# Patient Record
Sex: MALE | Race: WHITE | NOT HISPANIC OR LATINO | URBAN - METROPOLITAN AREA
[De-identification: names, ages, dates, MRNs, and addresses within clinical notes are randomized per-mention and may not be internally consistent; named-entity substitution may affect disease eponyms.]

---

## 2019-05-01 ENCOUNTER — OFFICE VISIT CONVERTED (OUTPATIENT)
Dept: UROLOGY | Facility: CLINIC | Age: 69
End: 2019-05-01
Attending: UROLOGY

## 2020-09-29 ENCOUNTER — OFFICE VISIT CONVERTED (OUTPATIENT)
Dept: UROLOGY | Facility: CLINIC | Age: 70
End: 2020-09-29
Attending: NURSE PRACTITIONER

## 2020-10-27 ENCOUNTER — TELEPHONE CONVERTED (OUTPATIENT)
Dept: UROLOGY | Facility: CLINIC | Age: 70
End: 2020-10-27
Attending: NURSE PRACTITIONER

## 2020-11-13 ENCOUNTER — OFFICE VISIT CONVERTED (OUTPATIENT)
Dept: UROLOGY | Facility: CLINIC | Age: 70
End: 2020-11-13
Attending: UROLOGY

## 2020-11-13 ENCOUNTER — CONVERSION ENCOUNTER (OUTPATIENT)
Dept: SURGERY | Facility: CLINIC | Age: 70
End: 2020-11-13

## 2020-11-14 ENCOUNTER — HOSPITAL ENCOUNTER (OUTPATIENT)
Dept: GENERAL RADIOLOGY | Facility: HOSPITAL | Age: 70
Discharge: HOME | End: 2020-11-14
Attending: UROLOGY

## 2020-11-14 ENCOUNTER — HOSPITAL ENCOUNTER (OUTPATIENT)
Dept: PREADMISSION TESTING | Facility: HOSPITAL | Age: 70
Discharge: HOME | End: 2020-11-14
Attending: UROLOGY

## 2020-11-14 LAB
ANION GAP SERPL CALC-SCNC: 13 MMOL/L (ref 8–19)
BUN SERPL-MCNC: 34 MG/DL (ref 5–25)
BUN/CREAT SERPL: 17 {RATIO} (ref 6–20)
CALCIUM SERPL-MCNC: 9.6 MG/DL (ref 8.7–10.4)
CHLORIDE SERPL-SCNC: 97 MMOL/L (ref 99–111)
CONV CO2: 27 MMOL/L (ref 22–32)
CREAT UR-MCNC: 2 MG/DL (ref 0.7–1.2)
GFR SERPLBLD BASED ON 1.73 SQ M-ARVRAT: 32 ML/MIN/{1.73_M2}
GLUCOSE SERPL-MCNC: 113 MG/DL (ref 70–99)
OSMOLALITY SERPL CALC.SUM OF ELEC: 282 MOSM/KG (ref 273–304)
POTASSIUM SERPL-SCNC: 4.6 MMOL/L (ref 3.5–5.3)
SODIUM SERPL-SCNC: 132 MMOL/L (ref 135–147)

## 2020-11-15 LAB — SARS-COV-2 RNA SPEC QL NAA+PROBE: NOT DETECTED

## 2020-11-18 ENCOUNTER — HOSPITAL ENCOUNTER (OUTPATIENT)
Dept: PERIOP | Facility: HOSPITAL | Age: 70
Setting detail: HOSPITAL OUTPATIENT SURGERY
Discharge: HOME | End: 2020-11-18
Attending: UROLOGY

## 2020-12-15 ENCOUNTER — OFFICE VISIT CONVERTED (OUTPATIENT)
Dept: UROLOGY | Facility: CLINIC | Age: 70
End: 2020-12-15
Attending: UROLOGY

## 2020-12-15 ENCOUNTER — HOSPITAL ENCOUNTER (OUTPATIENT)
Dept: OTHER | Facility: HOSPITAL | Age: 70
Discharge: HOME | End: 2020-12-15
Attending: UROLOGY

## 2020-12-15 ENCOUNTER — HOSPITAL ENCOUNTER (OUTPATIENT)
Dept: SURGERY | Facility: CLINIC | Age: 70
Discharge: HOME | End: 2020-12-15
Attending: UROLOGY

## 2020-12-15 LAB
ANION GAP SERPL CALC-SCNC: 12 MMOL/L (ref 8–19)
BUN SERPL-MCNC: 23 MG/DL (ref 5–25)
BUN/CREAT SERPL: 19 {RATIO} (ref 6–20)
CALCIUM SERPL-MCNC: 9.3 MG/DL (ref 8.7–10.4)
CHLORIDE SERPL-SCNC: 99 MMOL/L (ref 99–111)
CONV CO2: 30 MMOL/L (ref 22–32)
CREAT UR-MCNC: 1.2 MG/DL (ref 0.7–1.2)
GFR SERPLBLD BASED ON 1.73 SQ M-ARVRAT: 60 ML/MIN/{1.73_M2}
GLUCOSE SERPL-MCNC: 93 MG/DL (ref 70–99)
OSMOLALITY SERPL CALC.SUM OF ELEC: 285 MOSM/KG (ref 273–304)
POTASSIUM SERPL-SCNC: 4.8 MMOL/L (ref 3.5–5.3)
SODIUM SERPL-SCNC: 136 MMOL/L (ref 135–147)

## 2020-12-18 LAB
BACTERIA UR CULT: ABNORMAL
CIPROFLOXACIN SUSC ISLT: >=8
DAPTOMYCIN SUSC ISLT: 0.5
DOXYCYCLINE SUSC ISLT: 1
ERYTHROMYCIN SUSC ISLT: <=0.25
GENTAMICIN SUSC ISLT: <=0.5
LEVOFLOXACIN SUSC ISLT: >=8
NITROFURANTOIN SUSC ISLT: <=16
OXACILLIN SUSC ISLT: >=4
RIFAMPIN SUSC ISLT: <=0.5
TETRACYCLINE SUSC ISLT: 2
TIGECYCLINE SUSC ISLT: 0.25
TMP SMX SUSC ISLT: <=10
VANCOMYCIN SUSC ISLT: 2

## 2021-05-10 NOTE — H&P
"   History and Physical      Patient Name: Srinath Slaughter   Patient ID: [id number]   Sex: Male   YOB: 1950    Primary Care Provider: Val Horn NP   Referring Provider: Val Horn NP    Visit Date: November 13, 2020    Provider: Damion Rodrigues MD   Location: [facility]   Location Address: [address] [place] [address]  [id number]   Location Phone: [phone]          Chief Complaint  · Outpatient History & Physical / Surgical Orders      History Of Present Illness  [facility]  Outpatient History and Physical Surgical Orders  Preadmission Location: Phone Preadmission Time: 03:00 PM   Which Facility: [facility] Surgery Date: 11/18/2020 Preadmission Testing Date: 11/16/2020   Patient's Name: Srinath Slaughter YOB: 1950   Chief complaint/history present illness: urinary retention   Current Medication List: Colace 100 mg oral capsule, levofloxacin 250 mg oral tablet, lisinopril 40 mg oral tablet, Miralax 17 gram/dose oral powder, potassium chloride 20 mEq oral tablet extended release, tamsulosin 0.4 mg oral capsule, and torsemide 20 mg oral tablet   Allergies: Latex Exam Gloves and No known history of drug allergy   Significant past medical: Bladder Disorder, Elevated PSA, Hypertension, Prostate Disorder, and Prostate mass   Past Surgical History: Hernia Repair, Hip replacement, right, and Hip replacement, total, left   Examination of heart and lungs: Regular rate, rhythm, no murmur, gallop, rub, Breath sounds normal, no distress, and Abdomen soft, non-tender, BSx4 are positive         Vitals  Date Time BP Position Site L\R Cuff Size HR RR TEMP (F) WT  HT  BMI kg/m2 BSA m2 O2 Sat FR L/min FiO2 HC       11/13/2020 02:07 PM       22  300lbs 0oz 5'  9\" 44.3 2.57                 Assessment  · Pre-Surgical Orders     V72.84      Plan  · Orders  o General Urology Surgery Order (UROSU) - V72.84 - 11/18/2020  o [facility] Covid-19 " Screening (26653) - V72.84 - 11/14/2020   scheduled at 1330  · Medications  o Medications have been Reconciled  o Transition of Care or Provider Policy  · Instructions  o *****Surgical Orders******  o Pre-Operative Orders: Sign permit for Transurethral Resection of Bladder Tumor and possible instillation of gemcitabine into bladder  o Outpatient   o Levaquin 250 mg IV OCTOR.  o RISK AND BENEFITS:  o Possible risks/complications, benefits and alternatives to surgical or invasive procedure have been explained to patient and/or legal guardian.            Electronically Signed by: Damion Rodrigues MD -Author on November 13, 2020 04:21:57 PM

## 2021-05-10 NOTE — PROCEDURES
Procedure Note      Patient Name: Srinath Slaughter   Patient ID: [id number]   Sex: Male   YOB: 1950    Primary Care Provider: Val Horn NP   Referring Provider: Val Horn NP    Visit Date: November 13, 2020    Provider: Damion Rodrigues MD   Location: [facility]   Location Address: [address] [place] [address]  [id number]   Location Phone: [phone]          Cystoscopy Procedure:  The patients urine was viewe d under a microscope during his clinical visit: no RBC present, no WBC present, no Bacteria present.      Uncircumcised phallus    PROCEDURE: Flexible cystoscope was passed per urethra into the bladder without difficulty after proper consent.     5 cm prostate    Bladder was large with some moderate trabeculations throughout.    On the right lateral wall towards the dome there was a large area 4 cm x 5 cm that was abnormal looking with some rugated appearance.  Erythematous and raised.  Not papillary in nature    The bladder was inspected in a systematic meridian fashion.  Both ureteral orifices were identified and were normal in appearance. The flexible cystoscope was removed. The patient tolerated the procedure well.           Assessment  · BPH with urinary obstruction       Benign prostatic hyperplasia with lower urinary tract symptoms     600.01/N40.1  Other obstructive and reflux uropathy     600.01/N13.8      Plan  · Orders  o Cystoscopy (75498) - 600.01/N40.1, 600.01/N13.8 - 11/13/2020  · Medications  o Medications have been Reconciled  o Transition of Care or Provider Policy                    Electronically Signed by: Damion Rodrigues MD -Author on November 13, 2020 04:00:41 PM

## 2021-05-10 NOTE — H&P
History and Physical      Patient Name: Srinath Slaughter   Patient ID: [id number]   Sex: Male   YOB: 1950    Primary Care Provider: Srinath Eubanks MD   Referring Provider: Val Horn NP    Visit Date: September 29, 2020    Provider: VICTOR MANUEL Hodge   Location: [facility]   Location Address: [address] [place] [address]  [id number]   Location Phone: [phone]          Chief Complaint  · pt here for urologic concerns      History Of Present Illness  The patient is a 70 year old /White male, who is sent by Val Horn NP, for evaluation of urinary problems.   Symptoms  The patient's complaints are described as frequency, dysuria, urgency of urination, urge incontinence, and weak stream. This patient denies gross hematuria.   He states that nothing has made symptoms better in the past, and nothing makes it worse. The patient's past medical history notable for PSA elevation.      States that he has had this issue that is worsened over the last 2 weeks.    He saw his primary care provider on the 20th of this month for a follow-up.  At that point in time he had mentioned to his primary care provider that he had some urinary frequency and the primary care provider noted that he had had a previously elevated PSA that he had seen urology for in 2019.    The patient's previous PSA level from 9/23/2019 was 5.4 although his PSA obtained after that visit has now risen to 10.9 on 9/26/2020    His renal function has also declined as well and unsure if this is from a bladder outlet obstruction or from another process.     The patient was started on doxycycline and tamsulosin after that visit with his primary care provider as his symptoms sounded consistent with prostatitis as the patient stated he had burning with urination increased nighttime voiding and urinary urgency with urinary frequency.    The patient was referred back to urology as there was a concern for acute  "prostatitis.    PVR today is 271 and the patient has had to leave the exam room multiple times to void during his visit.         Past Medical History  Disease Name Date Onset Notes   Bladder Disorder --  --    Elevated PSA --  --    Hypertension --  --    Prostate Disorder --  --          Past Surgical History  Procedure Name Date Notes   Hernia --  --    Hernia Repair --  --    Hip replacement, right --  --    Hip replacement, total, left --  --          Medication List  Name Date Started Instructions   Colace 100 mg oral capsule  take 1 capsule (100 mg) by oral route once daily   doxycycline monohydrate 100 mg oral capsule 2020 take 1 capsule (100 mg) by oral route 2 times per day for 14 days   lisinopril 40 mg oral tablet  take 1 tablet (40 mg) by oral route once daily   Miralax 17 gram/dose oral powder  take 17 gram mixed with 8 oz. water, juice, soda, coffee or tea by oral route once daily   potassium chloride 20 mEq oral tablet extended release  take 1 tablet (20 meq) by oral route once daily with food   tamsulosin 0.4 mg oral capsule 2020 take 2 capsules (0.8 mg) by oral route once daily 1/2 hour following the same meal each day for 30 days   torsemide 20 mg oral tablet  take 2 tablets (40 mg) by oral route once daily         Allergy List  Allergen Name Date Reaction Notes   Latex Exam Gloves --  --  --    No known history of drug allergy --  --  --          Family Medical History  Disease Name Relative/Age Notes   Stroke Mother/   --    Heart Failure Grandfather (maternal)/  Grandmother (maternal)/    MI age 90\"s         Social History  Finding Status Start/Stop Quantity Notes   Alcohol Never --/-- --  --    Smokeless tobacco Current some day --/-- --  --    Tobacco Never --/-- --  --          Review of Systems  · Constitutional  o Denies  o : fever, chills  · Eyes  o Denies  o : double vision, cataracts  · HENT  o Denies  o : hearing loss, headaches  · Cardiovascular  o Denies  o : chest " "pain at rest, chest pain with exercise, irregular heart beats, palpitations, leg cramps with exercise  · Respiratory  o Denies  o : shortness of breath, wheezing, sleep apnea  · Gastrointestinal  o Denies  o : heartburn or indigestion, nausea or vomiting, change in abdominal girth, diarrhea, constipation, blood in stools  · Genitourinary  o Admits  o : additional symptoms as noted in HPI  · Integument  o Denies  o : rash, new skin lesions  · Neurologic  o Denies  o : memory difficulties, headache, mini-strokes, seizures  · Endocrine  o Denies  o : hot flashes, thyroid disorders  · Psychiatric  o Denies  o : depression, schizophrenia, bipolar disorder  · Heme-Lymph  o Denies  o : easy bleeding, easy bruising, sickle cell disease or trait, lymph node enlargement or tenderness  · Allergic-Immunologic  o Denies  o : immune deficiency, HIV, Hepatitis C      Vitals  Date Time BP Position Site L\R Cuff Size HR RR TEMP (F) WT  HT  BMI kg/m2 BSA m2 O2 Sat FR L/min FiO2        09/29/2020 12:54 PM       14  300lbs 6oz 5'  10\" 43.1 2.59             Physical Examination  · Constitutional  o Appearance  o : Well nourished, well developed patient in no acute distress. Ambulating without difficulty.  · Head and Face  o Head  o :   § Inspection  § : atraumatic, normocephalic  o Face  o :   § Inspection  § : no facial lesions  · Eyes  o Sclerae  o : sclerae white  · Ears, Nose, Mouth and Throat  o Ears  o :   § External Ears  § : appearance within normal limits, no lesions present  o Nose  o :   § External Nose  § : appearance normal  · Neck  o Inspection/Palpation  o : normal appearance, trachea midline  · Respiratory  o Respiratory Effort  o : breathing unlabored  o Inspection of Chest  o : normal appearance, no retractions  o Auscultation of Lungs  o : normal breath sounds throughout  · Genitourinary  o Bladder  o : non-palpable  o Penis  o : normal general appearance, circumcised, no lesions present, no discharge, no masses " present  o Urethral Meatus  o : no inflammation present, no stenosis  o Digital Rectal Examination  o :   § Tone and Masses  § : normal sphincter tone, no rectal masses present  § Prostate  § : nontender to palpation, enlarged, right lobe larger than the left l, no nodules present, consistency normal  § Seminal Vesicles  § : normal size and symmetry without masses or tenderness  · Musculoskeletal  o Pelvis  o : no pelvic bony or muscular tenderness  o Ribs  o : no deformities or tenderness to palpation present, costochondral junctions nontender to palpation  · Skin and Subcutaneous Tissue  o General Inspection  o : no rashes or lesions present, no lesions present, no areas of discoloration  · Neurologic  o Mental Status Examination  o :   § Orientation  § : grossly oriented to person, place and time  § Speech/Language  § : communication ability within normal limits  o Gait and Station  o : normal gait, able to stand without difficulty  · Psychiatric  o Judgement and Insight  o : judgment and insight intact, judgement for everyday activities and social situations within normal limits, insight intact  o Mood and Affect  o : mood normal, affect appropriate          Results  · In-Office Procedures  o Surgical procedure  § IOP - Bladder Scan/Residual Urine (13310)   § Specimen vol Ur: 271   o Lab procedure  § Automated dipstick urinalysis with microscopy (68090)   § Color Ur: Yellow   § Clarity Ur: Clear   § Glucose Ur Ql Strip: Negative   § Bilirub Ur Ql Strip: Negative   § Ketones Ur Ql Strip: Negative   § Sp Gr Ur Qn: 1.015   § Hgb Ur Ql Strip: Negative   § pH Ur-LsCnc: 5.5   § Prot Ur Ql Strip: Negative   § Urobilinogen Ur Strip-mCnc: 0.2 E.U./dL   § Nitrite Ur Ql Strip: Negative   § WBC Est Ur Ql Strip: Negative   § RBC UrnS Qn HPF: 0   § WBC UrnS Qn HPF: 0   § Bacteria UrnS Qn HPF: 0   § Crystals UrnS Qn HPF: 0   § Epithelial Cells (non renal): 0 /HPF  § Epithelial Cells (renal): 0       Assessment  · Elevated  prostate specific antigen (PSA)     790.93/R97.20  · Prostate mass     602.8/N42.89  · Prostatitis     601.9/N41.9    Problems Reconciled  Plan  · Orders  o MRI prostate wo then w contrast (52470) - 602.8/N42.89, 601.9/N41.9, 790.93/R97.20 - 09/29/2020  · Medications  o doxycycline monohydrate 100 mg oral capsule   SIG: take 1 capsule (100 mg) by oral route 2 times per day for 14 days   DISP: (28) Capsule with 0 refills  Prescribed on 09/30/2020     o tamsulosin 0.4 mg oral capsule   SIG: take 2 capsules (0.8 mg) by oral route once daily 1/2 hour following the same meal each day for 30 days   DISP: (60) Capsule with 11 refills  Adjusted on 09/30/2020     o Medications have been Reconciled  o Transition of Care or Provider Policy  · Instructions  o DISCUSSION: elevated PSA to 10.9; prior PSA 5.4DRE findings discussed; no family h/o prostate cancer in family. Recommend proceeding with further w/u for prostate cancer. Options include standard prostate biopsy or prostate MRI with MR fusion biopsy as indicated. Discussed this recommendation with patient at length. Patient aware via the autopsy study that his risk of having prostate cancer is approximate to his age; 60%. Patient also aware that the majority of prostate cancers detected are low or very low risk that are amenable to active surveillance protocols. Patient notes understanding that whether or not further workup for prostate cancer is pursued, a diagnosis of clinically significant prostate cancer would remains uncertain unless detected on biopsy. A negative biopsy, although reassuring, would not eliminate the possible need for further surveillance of PSA, possible future biopsy, and imaging as he have undetected prostate cancer. Also discussed the significance of PSA as a risk stratifier; decision to perform biopsy is not reliant upon 1 elevated lab value however his asymmetrical enlargement in his prostate dose confirm that further testing will need to be  performed . Patient and son in law participated in the discussion and asked appropriate questions. All questions answered to the best of my ability and his apparent satisfaction.   o Findings, possible etiologies and management options were discussed with patient in comprehensive detail  o PLAN:  o Rx. given for medication...  o Schedue further diagnostic testing  o Electronically Identified Patient Education Materials Provided Electronically            Electronically Signed by: VICTOR MANUEL Hodge -Author on September 30, 2020 09:20:41 PM

## 2021-05-13 NOTE — PROGRESS NOTES
Progress Note      Patient Name: Srinath Slaughter   Patient ID: [id number]   Sex: Male   YOB: 1950    Primary Care Provider: Val Horn NP   Referring Provider: Val Horn NP    Visit Date: November 13, 2020    Provider: Damion Rodrigues MD   Location: [facility]   Location Address: [address] [place] [address]  [id number]   Location Phone: [phone]          Chief Complaint  · pt here for urologic issues      History Of Present Illness     70-year-old  gentleman h/o elevated PSA her today with BPH with retention, large volume 1800 mL    Currently on Flomax 0.8 mg daily.  Patient had a frequency every 15 minutes.  Very bothersome.    no gross hematuria, dysuria or recurrent urinary tract infections.      No urologic family history,   Has never had any urologic surgery.    H/o CHF on Dr Hanley.  Patient does not smoke.  Patient does not use blood thinner. ZHEN    9/20 creatinine 2.5, GFR 24  3/19 creatinine 1.5, GFR 46    PVR    10/20  271      PSA    10/20 MRI prostate - 112 g, marked distention of the urinary bladder, small bladder diverticuli and trabeculation.  Before T2 localizing lesion.  PSA 0.09  9/19 10.9  9/19  5.4  3/19  7.2  11/17 prostate biopsy 45 g -negative  9/17 7.37  8/17 6.32       Past Medical History  Bladder Disorder; Elevated PSA; Hypertension; Prostate Disorder; Prostate mass         Past Surgical History  Hernia Repair; Hip replacement, right; Hip replacement, total, left         Medication List  Colace 100 mg oral capsule; lisinopril 40 mg oral tablet; Miralax 17 gram/dose oral powder; potassium chloride 20 mEq oral tablet extended release; tamsulosin 0.4 mg oral capsule; torsemide 20 mg oral tablet         Allergy List  Latex Exam Gloves; No known history of drug allergy         Family Medical History  Stroke; Heart Failure         Social History  Alcohol (Never); Smokeless tobacco (Current some day); Tobacco (Never)         Review  "of Systems  · Constitutional  o Denies  o : chills  · Gastrointestinal  o Denies  o : nausea      Vitals  Date Time BP Position Site L\R Cuff Size HR RR TEMP (F) WT  HT  BMI kg/m2 BSA m2 O2 Sat FR L/min FiO2        11/13/2020 02:07 PM       22  300lbs 0oz 5'  9\" 44.3 2.57             Physical Examination  · Constitutional  o Appearance  o : Well-appearing, well-developed, in no acute distress  · Respiratory  o Respiratory Effort  o : Unlabored breathing  · Cardiovascular  o Heart  o :   § Auscultation of Heart  § : Regular rate and rhythm, no murmurs  · Gastrointestinal  o Abdominal Examination  o : Nontender, nondistended, no rigidity or guarding, no hepatosplenomegaly  · Genitourinary  o Bladder  o : nonpalpable  o Penis  o : circumcised phallus with no masses or lesions  o Urethral Meatus  o : no inflammation present and no stenosis  o Scrotum and Scrotal Contents  o :   § Testes  § : Bilateral descended testicles no masses or lesions  o Digital Rectal Examination  o :   § Tone and Masses  § : Normal sphincter tone, no rectal masses present  § Prostate  § : prostate nontender with no nodules and normal consistency  · Neurologic  o Mental Status Examination  o :   § Orientation  § : Grossly oriented to person, place and time, judgment and insight intact, normal mood and affect          Results  · In-Office Procedures  o Surgical procedure  § IOP - Bladder Scan/Residual Urine (33797)   § Specimen vol Ur: 944   o Lab procedure  § Automated Dipstick Urinalysis (Surg Spec) WITHOUT Micro HMH (27295)   § Color Ur: Yellow   § Clarity Ur: Clear   § Glucose Ur Ql Strip: Negative   § Bilirub Ur Ql Strip: Negative   § Ketones Ur Ql Strip: Negative   § Sp Gr Ur Qn: 1.010   § Hgb Ur Ql Strip: Negative   § pH Ur-LsCnc: 5.5   § Prot Ur Ql Strip: Negative   § Urobilinogen Ur Strip-mCnc: 0.2 E.U./dL   § Nitrite Ur Ql Strip: Negative   § WBC Est Ur Ql Strip: Negative       Assessment  · Elevated PSA     790.93/R97.20  · Urinary " retention     788.20/R33.9  · BPH (benign prostatic hyperplasia)     600.00/N40.0  · Bladder tumor     239.4/D49.4      Plan  · Medications  o Medications have been Reconciled  o Transition of Care or Provider Policy  · Instructions  o Electronically Identified Patient Education Materials Provided Electronically     Elevated PSA    Patient with a large prostate.  Low PSA density and negative prostate MRI.  Discussed with the patient at this time I do not see any indication for biopsy.  We did discuss there is always some risk of finding a low-grade prostate cancer on TURP specimen but patient understands and accepts these risks.      BPH with urinary retention    Patient with large volume retention today, on cystoscopy was found to have small abnormal tissue on the right side of his bladder possible bladder tumor/cancer.    I do think his prostate would be amenable to a TURP, but this being said I want to make sure he does not have some underlying malignancy in his bladder before we proceed.  Risk and benefits of TURP were discussed today.  Risk and benefits of TURBT with possible instillation of gemcitabine were discussed today.  Risks and benefits were discussed including bleeding, infection and damage to the urinary system.  We also discussed the risk of anesthesia up to and including death.  Patient voiced understanding and would like to proceed.     Patient will take levofloxacin 250 mg 1 tab 2 days before surgery.    Catheter was placed today, will be left in place until surgery      Greater than 40 minutes was used in counseling and coordination of care, with greater than 51% of this in face-to-face counseling             Electronically Signed by: Damion Rodrigues MD -Author on November 13, 2020 04:03:01 PM

## 2021-05-13 NOTE — PROGRESS NOTES
Quick Note      Patient Name: Srinath Slaughter   Patient ID: [id number]   Sex: Male   YOB: 1950    Primary Care Provider: Val Horn NP   Referring Provider: Val Horn NP    Visit Date: October 27, 2020    Provider: VICTOR MANUEL Hodge   Location: [facility]   Location Address: [address] [place] [address]  [id number]   Location Phone: [phone]          History Of Present Illness  TELEHEALTH TELEPHONE VISIT  Srinath Slaughter is a 70 year old /White male who is presenting for evaluation via telehealth telephone visit. Verbal consent obtained before beginning visit.   Provider spent 12 minutes with the patient during the telehealth visit.   The following staff were present during this visit: Homero Solis LPN, Isabel RUSH   Past Medical History/ Overview of Patient Symptoms     Called patient's daughter to inform of results of MRI of the prostate performed on 10/24/2020 reveal Heterogeneous signal intensity in enhancement in the enlarged central gland is related to BPH.  Prominent nodules of BPH are present bilaterally. Total prostate volume of 112.43 mL. No suspicious T2 localizing lesion to suggest significant prostate malignancy.  Marked distention of the urinary bladder.  Bladder diverticula and trabeculation of the wall related to outlet obstruction from prostate hypertrophy.  Mild prominence of the distal ureters likely reflux or increase intravesicular pressure.  Possible right renal cyst although not further evaluated in this exam.    Spoke with Dr. Rodrigues about this case as the patients PSA density is <0.15 no need for biopsy at this time. Especially given no suspicious lesions are found.     The patient's daughter states that the patient's symptoms have continued to worsen.  The patient's daughter states that he is up all night and wears pads all day and he has significant incontinence at night.    The patient continues to drink tea  and Coke with dinner and drinks anywhere from 4 to 5 cups of coffee a day.    Reinforced education on behavioral modifications to alleviate nighttime voiding.    The patient's daughter states that he is very interested in a TURP procedure to alleviate his urinary issues.    Explained to the patient's daughter that his urgency and frequency may be related to his poor bladder emptying>           Assessment  · Elevated PSA     790.93/R97.20  · BPH (benign prostatic hyperplasia)     600.00/N40.0      Plan  · Orders  o Physician Telephone Evaluation, 11-20 minutes (05038) - 790.93/R97.20, 600.00/N40.0 - 10/27/2020  o PSA ultrasensitive DIAGNOSTIC [facility] (81559) - 790.93/R97.20, 600.00/N40.0 - 04/27/2021  · Medications  o Medications have been Reconciled  o Transition of Care or Provider Policy  · Instructions  o Plan Of Care: Will refer to Dr. Rodrigues to discuss surgical options for BPH as his symptoms are worsening and he has had no relief with modalities thus far. Discussed that patient will need to continue to decrease caffeine intake.            Electronically Signed by: VICTOR MANUEL Hodge -Author on October 27, 2020 08:41:25 AM

## 2021-05-14 VITALS — WEIGHT: 300 LBS | BODY MASS INDEX: 44.43 KG/M2 | RESPIRATION RATE: 22 BRPM | HEIGHT: 69 IN

## 2021-05-14 VITALS — BODY MASS INDEX: 41.01 KG/M2 | WEIGHT: 286.5 LBS | RESPIRATION RATE: 14 BRPM | HEIGHT: 70 IN

## 2021-05-14 VITALS — BODY MASS INDEX: 43 KG/M2 | WEIGHT: 300.37 LBS | HEIGHT: 70 IN | RESPIRATION RATE: 14 BRPM

## 2021-05-14 NOTE — PROGRESS NOTES
Progress Note      Patient Name: Srinath Slaughter   Patient ID: [id number]   Sex: Male   YOB: 1950    Primary Care Provider: Val Horn NP   Referring Provider: Val Horn NP    Visit Date: December 15, 2020    Provider: Damion Rodrigues MD   Location: [facility]   Location Address: [address] [place] [address]  [id number]   Location Phone: [phone]          Chief Complaint  · pt is having urological issues      History Of Present Illness     70-year-old  gentleman h/o elevated PSA ,BPH with retention, large volume 1800 mL, indwelling catheter and recently diagnosed low-grade pTa TCC, covering greater than half bladder.    Cardiology has recently seen patient and not cleared him for cystectomy.    Catheter is in place. Not that bothersome.    on Flomax 0.8 mg daily.      Has seen Dr. Arce, he is going to redo TURBT before moving forward with cystectomy.  Scheduled for 12/29.    11/18/2020 TURBT -5 cm prostate, extremely large bladder with some trabeculations.  85% of bladder covered with erythematous raised abnormal looking tissue.  Especially on the posterior left and dome  path -noninvasive papillary TCC, low-grade    Previous    frequency every 15 minutes.  Very bothersome.    No urologic family history    H/o CHF on Dr Hanley.  Patient does not smoke.  Patient does not use blood thinner. ZHEN    9/20 creatinine 2.5, GFR 24  3/19 creatinine 1.5, GFR 46    PVR    10/20  271      PSA    10/20 MRI prostate - 112 g, marked distention of the urinary bladder, small bladder diverticuli and trabeculation.  Before T2 localizing lesion.  PSA 0.09  9/19 10.9  9/19  5.4  3/19  7.2  11/17 prostate biopsy 45 g -negative  9/17 7.37  8/17 6.32       Past Medical History  Bladder Disorder; Elevated PSA; Hypertension; Prostate Disorder; Prostate mass         Past Surgical History  Hernia Repair; Hip replacement, right; Hip replacement, total, left         Medication  "List  Colace 100 mg oral capsule; lisinopril 40 mg oral tablet; Miralax 17 gram/dose oral powder; potassium chloride 20 mEq oral tablet extended release; torsemide 20 mg oral tablet         Allergy List  Latex Exam Gloves; No known history of drug allergy       Allergies Reconciled  Family Medical History  Stroke; Heart Failure         Social History  Alcohol (Current some day); Smokeless tobacco (Current some day); Tobacco (Never)         Review of Systems  · Constitutional  o Denies  o : fever, headache, chills  · Eyes  o Denies  o : eye pain, double vision, blurred vision  · HENT  o Denies  o : sinus problems, sore throat, ear infection  · Cardiovascular  o Denies  o : chest pain, high blood pressure, varicosities  · Respiratory  o Denies  o : shortness of breath, wheezing, frequent cough  · Gastrointestinal  o Denies  o : nausea, vomiting, heartburn, indigestion, abdominal pain  · Genitourinary  o Denies  o : urgency, frequency, urinary retention, painful urination  · Integument  o Denies  o : rash, itching, boils  · Neurologic  o Denies  o : tingling or numbness, tremors, dizzy spells  · Musculoskeletal  o Denies  o : joint pain, neck pain, back pain  · Endocrine  o Denies  o : cold intolerance, heat intolerance, tired, excessive thirst, sluggish  · Psychiatric  o Admits  o : feels satisfied with life  o Denies  o : severe depression, concerns with hurting themselves  · Heme-Lymph  o Denies  o : swollen glands, blood clotting problems  · Allergic-Immunologic  o Denies  o : sinus allergy symptoms, hay fever      Vitals  Date Time BP Position Site L\R Cuff Size HR RR TEMP (F) WT  HT  BMI kg/m2 BSA m2 O2 Sat FR L/min FiO2        12/15/2020 11:18 AM       14  286lbs 8oz 5'  10\" 41.11 2.53             Physical Examination  · Constitutional  o Appearance  o : Well-appearing, well-developed, in no acute distress  · Respiratory  o Respiratory Effort  o : Unlabored breathing  · Cardiovascular  o Heart  o : "   § Auscultation of Heart  § : Regular rate and rhythm, no murmurs  · Gastrointestinal  o Abdominal Examination  o : Nontender, nondistended, no rigidity or guarding, no hepatosplenomegaly  · Neurologic  o Mental Status Examination  o :   § Orientation  § : Grossly oriented to person, place and time, judgment and insight intact, normal mood and affect     Cath in place draining clear/yellow urine               Assessment  · Elevated PSA     790.93/R97.20  · Urinary retention     788.20/R33.9  · BPH (benign prostatic hyperplasia)     600.00/N40.0  · Bladder tumor     239.4/D49.4  · Bladder cancer     188.9/C67.9      Plan  · Orders  o BMP Non-fasting [facility] ([id number]) - 790.93/R97.20, 600.00/N40.0, 239.4/D49.4, 188.9/C67.9 - 12/15/2020  o Urine Culture (Cath) [facility] ([id number]) - 788.20/R33.9 - 12/15/2020  · Medications  o Medications have been Reconciled  o Transition of Care or Provider Policy  · Instructions  o Electronically Identified Patient Education Materials Provided Electronically     LG TCC    He has seen Dr. Arce urologic oncology and is scheduled for TURBT on 12/29/2020.    Urinary retention    Continue Finnegan catheter, 18 French coud catheter placed today with urine culture sent    If patient's urine comes back positive I will pretreat him 5 days before his surgery with Dr. Arce    Cardiology says he needs to be seen and we will go ahead and make sure he has an appointment next few days to be seen by cardiology.    Follow-up me as needed.  Did discuss with patient I am glad to see him at any point after Dr. Arce does his TURBT if need be      Greater than 20 minutes was used in counseling and coordination of care, with greater than 51% of this in face-to-face counseling             Electronically Signed by: Damion Rodrigues MD -Author on December 15, 2020 02:13:17 PM

## 2021-05-15 VITALS — HEIGHT: 69 IN | RESPIRATION RATE: 16 BRPM | BODY MASS INDEX: 42.21 KG/M2 | WEIGHT: 285 LBS
